# Patient Record
Sex: MALE | Race: WHITE | HISPANIC OR LATINO | URBAN - METROPOLITAN AREA
[De-identification: names, ages, dates, MRNs, and addresses within clinical notes are randomized per-mention and may not be internally consistent; named-entity substitution may affect disease eponyms.]

---

## 2019-11-07 ENCOUNTER — EMERGENCY (EMERGENCY)
Facility: HOSPITAL | Age: 39
LOS: 1 days | Discharge: HOME | End: 2019-11-07
Attending: EMERGENCY MEDICINE
Payer: COMMERCIAL

## 2019-11-07 VITALS — DIASTOLIC BLOOD PRESSURE: 79 MMHG | HEART RATE: 74 BPM | RESPIRATION RATE: 16 BRPM | SYSTOLIC BLOOD PRESSURE: 124 MMHG

## 2019-11-07 VITALS
HEART RATE: 86 BPM | DIASTOLIC BLOOD PRESSURE: 78 MMHG | WEIGHT: 139.99 LBS | SYSTOLIC BLOOD PRESSURE: 129 MMHG | TEMPERATURE: 98 F | RESPIRATION RATE: 16 BRPM | OXYGEN SATURATION: 100 %

## 2019-11-07 PROCEDURE — 99283 EMERGENCY DEPT VISIT LOW MDM: CPT | Mod: 25

## 2019-11-07 PROCEDURE — 72100 X-RAY EXAM L-S SPINE 2/3 VWS: CPT

## 2019-11-07 PROCEDURE — 96372 THER/PROPH/DIAG INJ SC/IM: CPT

## 2019-11-07 PROCEDURE — 72100 X-RAY EXAM L-S SPINE 2/3 VWS: CPT | Mod: 26

## 2019-11-07 PROCEDURE — 99284 EMERGENCY DEPT VISIT MOD MDM: CPT

## 2019-11-07 RX ORDER — DIAZEPAM 5 MG
2 TABLET ORAL ONCE
Refills: 0 | Status: DISCONTINUED | OUTPATIENT
Start: 2019-11-07 | End: 2019-11-07

## 2019-11-07 RX ORDER — BACLOFEN 100 %
1 POWDER (GRAM) MISCELLANEOUS
Qty: 30 | Refills: 0
Start: 2019-11-07 | End: 2019-11-16

## 2019-11-07 RX ORDER — KETOROLAC TROMETHAMINE 30 MG/ML
30 SYRINGE (ML) INJECTION ONCE
Refills: 0 | Status: DISCONTINUED | OUTPATIENT
Start: 2019-11-07 | End: 2019-11-07

## 2019-11-07 RX ORDER — GABAPENTIN 400 MG/1
400 CAPSULE ORAL ONCE
Refills: 0 | Status: COMPLETED | OUTPATIENT
Start: 2019-11-07 | End: 2019-11-07

## 2019-11-07 RX ADMIN — Medication 20 MILLIGRAM(S): at 11:54

## 2019-11-07 RX ADMIN — GABAPENTIN 400 MILLIGRAM(S): 400 CAPSULE ORAL at 11:54

## 2019-11-07 RX ADMIN — Medication 30 MILLIGRAM(S): at 11:53

## 2019-11-07 RX ADMIN — Medication 2 MILLIGRAM(S): at 11:57

## 2019-11-07 NOTE — ED PROVIDER NOTE - CPE EDP RESP NORM
Patient ID: Vern is a 39 year old male.    Chief Complaint   Patient presents with   • Annual Exam     still has lingering dry cough x 1 mos w/SOB, no other sx w/cough     HPI    Patient here for annual physical.  He feels in good health and has no new complaints.  He tries to be physically active and his main issue is just struggling with caloric intake.  Current Outpatient Medications   Medication Sig Dispense Refill   • Multiple Vitamins-Minerals (MULTIVITAMIN PO) Take 1 tablet by mouth daily.       No current facility-administered medications for this visit.      ALLERGIES:  No Known Allergies  Past Medical History:   Diagnosis Date   • Exposure to hepatitis C     wife Hep C+     Past Surgical History:   Procedure Laterality Date   • Rotator cuff repair Right 2003        Review of Systems   Constitutional: Negative.  Negative for fever.   HENT: Negative.    Eyes: Negative.    Respiratory: Positive for cough (4 weeks after URI). Negative for shortness of breath.    Cardiovascular: Negative.  Negative for chest pain.   Gastrointestinal: Negative for nausea.   Genitourinary: Negative.    Musculoskeletal: Negative.    Skin: Negative.  Negative for rash.   Neurological: Negative.  Negative for dizziness.   All other systems reviewed and are negative.      Vitals: /72 (BP Location: [facility], Patient Position: Sitting, Cuff Size: Regular)   Pulse 87   Ht 5' 6.75\" (1.695 m)   Wt 90 kg (198 lb 6.4 oz)   BMI 31.31 kg/m²   BSA 2.01 m²      Physical Exam   Constitutional: He is oriented to person, place, and time and well-developed, well-nourished, and in no distress.   HENT:   Head: Normocephalic and atraumatic.   Eyes: Pupils are equal, round, and reactive to light. No scleral icterus.   Cardiovascular: Normal rate, regular rhythm and normal heart sounds.   No murmur heard.  Pulmonary/Chest: Breath sounds normal. He has no wheezes.   Abdominal: Soft. There is no tenderness.   Musculoskeletal: He exhibits no  edema.   Neurological: He is alert and oriented to person, place, and time.   Skin: Skin is warm and dry.   Psychiatric: Affect normal.   Nursing note and vitals reviewed.         Assessment and Plan:  (Z00.00) Annual physical exam  (primary encounter diagnosis)  Plan: POCT URINALYSIS DIPSTICK, CBC & AUTO         DIFFERENTIAL, LIPID PANEL WITHOUT REFLEX,         COMPREHENSIVE METABOLIC PANEL    Overall patient is doing well for age.  Discussed reducing carbohydrates and strategies to improve nutritional situation at length.  Is exercising but will attempt to reach target heart rate.    By history wife has hepatitis C and need to review and confirm seronegativity.                  Iam Kohler MD   normal...

## 2019-11-07 NOTE — ED PROVIDER NOTE - CLINICAL SUMMARY MEDICAL DECISION MAKING FREE TEXT BOX
Pt presents with lower back pain. Will provide pain control, muscle relaxants, check X-ray of back, and reassess.

## 2019-11-07 NOTE — ED PROVIDER NOTE - PATIENT PORTAL LINK FT
You can access the FollowMyHealth Patient Portal offered by [facility] by registering at the following website: [email or web address]. By joining [employer]’s FollowMyHealth portal, you will also be able to view your health information using other applications (apps) compatible with our system.

## 2019-11-07 NOTE — ED PROVIDER NOTE - OBJECTIVE STATEMENT
38 y/o male with no significant PMHx presents to the ED with c/o mid to lower back pain for 1 week. Pt states that he woke up 1 week ago and went to shower when he noticed lower back discomfort. The next day, pain worsened and pt was unable to stand from a sitting position without significant pain. Pt went to urgent care that day where he was diagnosed with muscle spasms and given Robaxin and Meloxicam.

## 2019-11-07 NOTE — ED PROVIDER NOTE - MUSCULOSKELETAL, MLM
Tenderness in paraspinal lumbosacral area, no midline tenderness, able to touch toes, full ROM, NV intact.